# Patient Record
Sex: FEMALE | Race: WHITE | NOT HISPANIC OR LATINO | Employment: FULL TIME | URBAN - METROPOLITAN AREA
[De-identification: names, ages, dates, MRNs, and addresses within clinical notes are randomized per-mention and may not be internally consistent; named-entity substitution may affect disease eponyms.]

---

## 2018-08-31 ENCOUNTER — OFFICE VISIT (OUTPATIENT)
Dept: INTERNAL MEDICINE | Facility: MEDICAL CENTER | Age: 33
End: 2018-08-31
Payer: MEDICAID

## 2018-08-31 VITALS
HEIGHT: 64 IN | OXYGEN SATURATION: 97 % | TEMPERATURE: 97.5 F | SYSTOLIC BLOOD PRESSURE: 128 MMHG | BODY MASS INDEX: 50.02 KG/M2 | DIASTOLIC BLOOD PRESSURE: 86 MMHG | HEART RATE: 62 BPM | WEIGHT: 293 LBS

## 2018-08-31 DIAGNOSIS — F33.1 MODERATE EPISODE OF RECURRENT MAJOR DEPRESSIVE DISORDER (HCC): ICD-10-CM

## 2018-08-31 DIAGNOSIS — E66.01 CLASS 3 SEVERE OBESITY DUE TO EXCESS CALORIES WITHOUT SERIOUS COMORBIDITY WITH BODY MASS INDEX (BMI) OF 50.0 TO 59.9 IN ADULT (HCC): ICD-10-CM

## 2018-08-31 DIAGNOSIS — Z80.3 FAMILY HISTORY OF BREAST CANCER: ICD-10-CM

## 2018-08-31 DIAGNOSIS — Z00.00 ENCOUNTER FOR PREVENTIVE CARE: ICD-10-CM

## 2018-08-31 DIAGNOSIS — N92.6 IRREGULAR PERIODS: ICD-10-CM

## 2018-08-31 DIAGNOSIS — H53.8 BLURRY VISION, BILATERAL: ICD-10-CM

## 2018-08-31 PROCEDURE — 99204 OFFICE O/P NEW MOD 45 MIN: CPT | Mod: GC | Performed by: INTERNAL MEDICINE

## 2018-08-31 ASSESSMENT — PATIENT HEALTH QUESTIONNAIRE - PHQ9
CLINICAL INTERPRETATION OF PHQ2 SCORE: 4
SUM OF ALL RESPONSES TO PHQ QUESTIONS 1-9: 9
5. POOR APPETITE OR OVEREATING: 1 - SEVERAL DAYS

## 2018-08-31 NOTE — PATIENT INSTRUCTIONS
Thank you for seeing me today.    We talked about:    1) Breast lumps  - Screening Mammogram of both breasts, diagnostic mammogram and ultrasound of your right breast, genetic testing for breast cancer gene  - Use warm compresses to help with any tenderness  - Use cornstarch under your breasts for irritation, use Gold Bond if needed    2) Blurry vision, headaches  - I referred you to opthalmology    3) Thyroid  - Checking your thyroid today    4) General health  - blood counts, electrolytes, liver function, cholesterol, diabetes screening  - Nutrition counseling    5) Depression  - Counseling    See you in 4 weeks.

## 2018-08-31 NOTE — PROGRESS NOTES
New Patient to Establish    Reason to establish: New patient to establish    CC: Breast lumps, weight     HPI: Ms. Smiley is a 32yo female here to establish care. She has not seek a PCP in recent years due to insurance issues.    Breast lumps: Constant, does not change in size with menstrual cycle. Tender today as she has just started her period. Has clear discharge of both breasts 1-2x times a month for the last 2 years, not sure of the amount of discharge, just notices it in her bra. Mother dx at age 27, grandmother in her 30's, great-grandmother with breast cancer.    Weight loss: Has been trying to lose weight the last 2 months. Low-carb diet, emphasis on vegetables, grilled chicken. Cut out soda. Keeps paper log of calories, av 1200cal daily. Takes walks at night.     HA: chronic, has had them most days since she was 17 years old. Pressure in the center of her forehead. Takes tylenol some days which helps.    Dizziness: last 6 months, comes and goes throughout the day. Whole room spins, gets blurry vision, and nausea for 30seconds. No precipitating factors. Stays hydrated, 140-150 ounces water daily.     Depression: Saw psychiatrist regularly up until recently. Was on medication last year, but having SI so stopped taking it. No current SI/HI.     Irregular periods: Gets periods every month, but never regular. Can last anywhere between 1-5 days. No heavy periods, no clots. Not on birth control as not currently sexually active. She has noticed some new hair on her chin.    Patient Active Problem List    Diagnosis Date Noted   • Class 3 severe obesity due to excess calories without serious comorbidity with body mass index (BMI) of 50.0 to 59.9 in adult ([facility]) 08/31/2018   • Family history of breast cancer 08/31/2018       History reviewed. No pertinent past medical history.    No current outpatient prescriptions on file.     No current facility-administered medications for this visit.        Allergies as of  "08/31/2018 - Reviewed 08/31/2018   Allergen Reaction Noted   • Amoxicillin  12/07/2014   • Pcn [penicillins]  12/07/2014       Social History     Social History   • Marital status:      Spouse name: N/A   • Number of children: N/A   • Years of education: N/A     Occupational History   • Not on file.     Social History Main Topics   • Smoking status: Never Smoker   • Smokeless tobacco: Never Used   • Alcohol use No   • Drug use: No   • Sexual activity: Not Currently     Partners: Male     Other Topics Concern   • Not on file     Social History Narrative    Two children: age 14, 6       Family History   Problem Relation Age of Onset   • Cancer Mother    • Cancer Maternal Grandmother        History reviewed. No pertinent surgical history.    ROS: As per HPI. Additional pertinent symptoms as noted below.    Constitutional:  no fevers/chills, no weakness/fatigue, no recent weight loss.   ENT:  no hearing loss/no changes in hearing.  No congestion.  No sore throat.  Cardiovascular:  no chest pain, no palpitations.  No orthopnea, no PND.  Respiratory:  no shortness of breath, no cough, no wheezing.  No sputum production.    GI:  no abdominal pain.  No nausea/vomiting.  No heartburn.  No diarrhea, no constipation.  No blood in stool.  :  no polyuria, no burning on urination.  No hematuria.    MSK:  no myalgias, no back pain, no joint pain.  Neurological: no numbness/tingling in extremities. No tremors. No changes in sensation.     /86   Pulse 62   Temp 36.4 °C (97.5 °F)   Ht 1.623 m (5' 3.9\")   Wt (!) 146.3 kg (322 lb 9.6 oz)   SpO2 97%   BMI 55.55 kg/m²     Physical Exam  General:  Alert and oriented, No apparent distress.  Eyes: Pupils equal and reactive. No scleral icterus.  Throat: Clear no erythema or exudates noted.  Neck: Supple. No lymphadenopathy noted. Thyroid not enlarged.  Lungs: Clear to auscultation and percussion bilaterally.  Cardiovascular: Regular rate and rhythm. No murmurs, rubs or " gallops  Breast exam: R breast: multiple 0.5cm cysts, tender cysts at 5 o'clock and 7 o'clock position, some mild tenderness under axilla but no LAD. L breast: 1cm cyst at 7 o'clock position, nontender, no LAD.  Abdomen:  Benign. No rebound or guarding noted.  Extremities: No clubbing, cyanosis, edema.  Skin: Clear. No rash or suspicious skin lesions noted.      Assessment and Plan  #Breast masses  #FHx breast cancer  Could be fibrocystic changes given young age and tenderness with menstruation, but given strong family history of breast cancers at young ages will evaluate further, especially multiple masses in R breast.  - BRCA gene testing.  - Screening mammogram of both breasts. Diagnostic mammogram of R breast and ultrasound of R breast.    #HA  #blurry vision  #dizziness  Could be tension HA given location of pain and resolution with NSAID, but given her obesity and new complaints of blurry vision bilaterally this could be Idiopathic Intracranial Hypertension. Dizziness could be due to BPPV, so will pursue that further if no concerns from opthalmology.  - Opthalmology to evaluate any papilledema.  - Advised to continue taking tylenol as needed.    #Obesity (BMI 50-59)  #Weight gain  Weight gain of 7lb despite effort to eat healthier and to exercise. Will get thyroid studies.  - TSH, T4.  - Referral to nutrition  - Advised to try using calorie counting rosalia to get better idea of calories eaten daily.    #Depression  PHQ 9 of 9 today indicates mild depression.    - Referral to behavioral health    #Irregular periods  Two successful pregnancies, but could be due to PCOS. If irregularity persists may pursue further testing.  - lipid panel    Preventive care:  - CBC, CMP, lipids  - Pap: 2012, due. Will make appt.    Followup: Return in about 4 weeks (around 9/28/2018).    Signed by: Elyssa Vazquez M.D.

## 2018-09-05 ENCOUNTER — TELEPHONE (OUTPATIENT)
Dept: RADIOLOGY | Facility: MEDICAL CENTER | Age: 33
End: 2018-09-05

## 2018-09-19 ENCOUNTER — APPOINTMENT (OUTPATIENT)
Dept: RADIOLOGY | Facility: MEDICAL CENTER | Age: 33
End: 2018-09-19
Attending: STUDENT IN AN ORGANIZED HEALTH CARE EDUCATION/TRAINING PROGRAM
Payer: MEDICAID